# Patient Record
Sex: MALE | Race: WHITE | URBAN - METROPOLITAN AREA
[De-identification: names, ages, dates, MRNs, and addresses within clinical notes are randomized per-mention and may not be internally consistent; named-entity substitution may affect disease eponyms.]

---

## 2023-11-13 ENCOUNTER — APPOINTMENT (OUTPATIENT)
Dept: URBAN - METROPOLITAN AREA CLINIC 299 | Age: 58
Setting detail: DERMATOLOGY
End: 2023-11-15

## 2023-11-13 DIAGNOSIS — L0292 CARBUNCLE AND FURUNCLE OF UNSPECIFIED SITE: ICD-10-CM

## 2023-11-13 DIAGNOSIS — L0293 CARBUNCLE AND FURUNCLE OF UNSPECIFIED SITE: ICD-10-CM

## 2023-11-13 PROBLEM — L02.224 FURUNCLE OF GROIN: Status: ACTIVE | Noted: 2023-11-13

## 2023-11-13 PROCEDURE — 99202 OFFICE O/P NEW SF 15 MIN: CPT

## 2023-11-13 PROCEDURE — OTHER COUNSELING: OTHER

## 2023-11-13 PROCEDURE — OTHER MIPS QUALITY: OTHER

## 2023-11-13 ASSESSMENT — LOCATION ZONE DERM: LOCATION ZONE: TRUNK

## 2023-11-13 ASSESSMENT — LOCATION SIMPLE DESCRIPTION DERM: LOCATION SIMPLE: GROIN

## 2023-11-13 ASSESSMENT — LOCATION DETAILED DESCRIPTION DERM: LOCATION DETAILED: RIGHT INGUINAL CREASE

## 2023-11-13 NOTE — HPI: BODY LOCATION - TRUNK
How Severe Is Your Condition?: moderate
Additional History: Spot has been tender at first appearance but has since healed